# Patient Record
Sex: FEMALE | Race: OTHER
[De-identification: names, ages, dates, MRNs, and addresses within clinical notes are randomized per-mention and may not be internally consistent; named-entity substitution may affect disease eponyms.]

---

## 2022-01-19 ENCOUNTER — HOSPITAL ENCOUNTER (EMERGENCY)
Dept: HOSPITAL 12 - ER | Age: 57
Discharge: HOME | End: 2022-01-19
Payer: COMMERCIAL

## 2022-01-19 VITALS — WEIGHT: 160 LBS | HEIGHT: 64 IN | BODY MASS INDEX: 27.31 KG/M2

## 2022-01-19 VITALS — DIASTOLIC BLOOD PRESSURE: 87 MMHG | SYSTOLIC BLOOD PRESSURE: 146 MMHG

## 2022-01-19 DIAGNOSIS — R04.0: Primary | ICD-10-CM

## 2022-01-19 DIAGNOSIS — F17.210: ICD-10-CM

## 2022-01-19 DIAGNOSIS — Z79.01: ICD-10-CM

## 2022-01-19 DIAGNOSIS — E87.6: ICD-10-CM

## 2022-01-19 DIAGNOSIS — I25.2: ICD-10-CM

## 2022-01-19 DIAGNOSIS — I48.91: ICD-10-CM

## 2022-01-19 DIAGNOSIS — E03.9: ICD-10-CM

## 2022-01-19 DIAGNOSIS — Z79.82: ICD-10-CM

## 2022-01-19 LAB
ALP SERPL-CCNC: 107 U/L (ref 50–136)
ALT SERPL W/O P-5'-P-CCNC: 24 U/L (ref 14–59)
AST SERPL-CCNC: 28 U/L (ref 15–37)
BILIRUB DIRECT SERPL-MCNC: 0.6 MG/DL (ref 0–0.2)
BILIRUB SERPL-MCNC: 1.3 MG/DL (ref 0.2–1)
BUN SERPL-MCNC: 30 MG/DL (ref 7–18)
CHLORIDE SERPL-SCNC: 102 MMOL/L (ref 98–107)
CO2 SERPL-SCNC: 35 MMOL/L (ref 21–32)
CREAT SERPL-MCNC: 1.1 MG/DL (ref 0.6–1.3)
GLUCOSE SERPL-MCNC: 99 MG/DL (ref 74–106)
HCT VFR BLD AUTO: 41.9 % (ref 31.2–41.9)
MCH RBC QN AUTO: 23.2 UUG (ref 24.7–32.8)
MCV RBC AUTO: 74.4 FL (ref 75.5–95.3)
PLATELET # BLD AUTO: 225 K/UL (ref 179–408)
POTASSIUM SERPL-SCNC: 3.2 MMOL/L (ref 3.5–5.1)
WS STN SPEC: 6.8 G/DL (ref 6.4–8.2)

## 2022-01-19 PROCEDURE — A4663 DIALYSIS BLOOD PRESSURE CUFF: HCPCS

## 2022-01-19 NOTE — NUR
Patient discharged to home in stable condition.  Written and verbal after care 
instructions given. 

Patient verbalizes understanding of instructions. Stressed follow up or return 
to ER for worsening s/s. Steady gait. Denies any dizziness/HA. No n/v/d.

## 2022-01-19 NOTE — NUR
PT AMBULATED TO ER WITH STEADY GAIT C/O NOSE BLEED SINCE 1/18/22 2130. PT A/O 
X4, NO SOB OR LABORED BREATHING. AFEBRILE. DENIES ANY N/V/D. NO DIZZINESS/HA. 
PT STATES ON ELIQUIS. CLEAR SPEECH, COMPLETE SENTENCES.